# Patient Record
Sex: FEMALE | Race: WHITE | URBAN - METROPOLITAN AREA
[De-identification: names, ages, dates, MRNs, and addresses within clinical notes are randomized per-mention and may not be internally consistent; named-entity substitution may affect disease eponyms.]

---

## 2017-05-16 ENCOUNTER — OUTPATIENT (OUTPATIENT)
Dept: OUTPATIENT SERVICES | Facility: HOSPITAL | Age: 62
LOS: 1 days | End: 2017-05-16

## 2019-09-30 PROBLEM — Z00.00 ENCOUNTER FOR PREVENTIVE HEALTH EXAMINATION: Status: ACTIVE | Noted: 2019-09-30

## 2019-10-03 ENCOUNTER — APPOINTMENT (OUTPATIENT)
Dept: OTOLARYNGOLOGY | Facility: CLINIC | Age: 64
End: 2019-10-03
Payer: COMMERCIAL

## 2019-10-03 VITALS
SYSTOLIC BLOOD PRESSURE: 144 MMHG | HEIGHT: 67 IN | BODY MASS INDEX: 30.29 KG/M2 | WEIGHT: 193 LBS | DIASTOLIC BLOOD PRESSURE: 75 MMHG | HEART RATE: 53 BPM

## 2019-10-03 DIAGNOSIS — Z83.3 FAMILY HISTORY OF DIABETES MELLITUS: ICD-10-CM

## 2019-10-03 DIAGNOSIS — K21.9 GASTRO-ESOPHAGEAL REFLUX DISEASE W/OUT ESOPHAGITIS: ICD-10-CM

## 2019-10-03 DIAGNOSIS — Z87.891 PERSONAL HISTORY OF NICOTINE DEPENDENCE: ICD-10-CM

## 2019-10-03 DIAGNOSIS — Z87.19 PERSONAL HISTORY OF OTHER DISEASES OF THE DIGESTIVE SYSTEM: ICD-10-CM

## 2019-10-03 DIAGNOSIS — Z86.79 PERSONAL HISTORY OF OTHER DISEASES OF THE CIRCULATORY SYSTEM: ICD-10-CM

## 2019-10-03 DIAGNOSIS — Z72.89 OTHER PROBLEMS RELATED TO LIFESTYLE: ICD-10-CM

## 2019-10-03 DIAGNOSIS — I48.91 UNSPECIFIED ATRIAL FIBRILLATION: ICD-10-CM

## 2019-10-03 PROCEDURE — 92567 TYMPANOMETRY: CPT

## 2019-10-03 PROCEDURE — 99204 OFFICE O/P NEW MOD 45 MIN: CPT | Mod: 25

## 2019-10-03 PROCEDURE — 92557 COMPREHENSIVE HEARING TEST: CPT

## 2019-10-03 RX ORDER — PREDNISONE 10 MG/1
10 TABLET ORAL
Qty: 45 | Refills: 0 | Status: ACTIVE | COMMUNITY
Start: 2019-10-03 | End: 1900-01-01

## 2019-10-03 NOTE — ASSESSMENT
[FreeTextEntry1] : Patient with Meniere's of left ear with continued balance problem.  Has had prior audio with snhl but audio today shows increase in snhl from recent prior audio.  Did have negative MRI 5/ 2018.   Recommended trial of oral steroids and will also get neurotology eval for consideration of IT therapy.  \par Also discussed low salt diet, caffene, alcohol and other foods that could affect problem..\par Patient also had stated that current left hearing aide not working well but this may be due to recent change in hearing - will have audiologist consider reprogram aide.  \par Will have patient follow up with Dr. Brantley with repeat audio at that time to see if  oral steroids were helpful\par \par Also discussed  change in diuretic from HCTZ to dyazide but will see how patient responds to prednisone before changing meds.  R and A to prednose discussed.  \par \par

## 2019-10-03 NOTE — HISTORY OF PRESENT ILLNESS
[de-identified] : Problem started about 4 years ago.  Occ nausea and vomits.  Problem intermittend - last episode August and September.  Freq has true spinning.  No blackout or diplopia.  Notes ringing in left ear.  Told of hearing loss - occ fluctuates.  Mri negative in 2018.  Does take hctz daily for bp.  \par Last audio 5/23/2018\par

## 2019-10-03 NOTE — REVIEW OF SYSTEMS
[Seasonal Allergies] : seasonal allergies [Sneezing] : sneezing [Post Nasal Drip] : post nasal drip [Ear Pain] : ear pain [Ear Itch] : ear itch [Hearing Loss] : hearing loss [Dizziness] : dizziness [Vertigo] : vertigo [Lightheadedness] : lightheadedness [Ear Drainage] : ear drainage [Ear Noises] : ear noises [Nasal Congestion] : nasal congestion [Sinus Pain] : sinus pain [Sinus Pressure] : sinus pressure [Sense Of Smell Problem] : sense of smell problem [Snoring With Pauses] : snoring with pauses [Hoarseness] : hoarseness [Throat Clearing] : throat clearing [Throat Pain] : throat pain [Throat Dryness] : throat dryness [Palpitations] : palpitations [Throat Itching] : throat itching [Shortness Of Breath] : shortness of breath [Wheezing] : wheezing [Cough] : cough [Negative] : Heme/Lymph [FreeTextEntry1] : heartburn, sweating at night, palpitations, daytime sleepiness

## 2019-10-03 NOTE — DATA REVIEWED
[de-identified] : reviewed prior audio and compared to current audio - significant decrease in hearing in left ear on today's audio  [de-identified] : MRI reviewed from 2018 - neg TB and IAC

## 2019-11-05 ENCOUNTER — APPOINTMENT (OUTPATIENT)
Dept: PHARMACY | Facility: CLINIC | Age: 64
End: 2019-11-05

## 2019-11-20 ENCOUNTER — APPOINTMENT (OUTPATIENT)
Dept: OTOLARYNGOLOGY | Facility: CLINIC | Age: 64
End: 2019-11-20
Payer: COMMERCIAL

## 2019-11-20 VITALS
SYSTOLIC BLOOD PRESSURE: 107 MMHG | BODY MASS INDEX: 30.29 KG/M2 | HEIGHT: 67 IN | HEART RATE: 68 BPM | DIASTOLIC BLOOD PRESSURE: 62 MMHG | WEIGHT: 193 LBS

## 2019-11-20 PROCEDURE — 99214 OFFICE O/P EST MOD 30 MIN: CPT | Mod: 25

## 2019-11-20 PROCEDURE — 92567 TYMPANOMETRY: CPT

## 2019-11-20 PROCEDURE — 92557 COMPREHENSIVE HEARING TEST: CPT

## 2019-11-20 PROCEDURE — 31231 NASAL ENDOSCOPY DX: CPT

## 2019-11-20 RX ORDER — ONDANSETRON 4 MG/1
4 TABLET, ORALLY DISINTEGRATING ORAL
Qty: 30 | Refills: 0 | Status: ACTIVE | COMMUNITY
Start: 2019-11-20 | End: 1900-01-01

## 2019-11-20 RX ORDER — DIAZEPAM 2 MG/1
2 TABLET ORAL
Qty: 30 | Refills: 0 | Status: ACTIVE | COMMUNITY
Start: 2019-11-20 | End: 1900-01-01

## 2019-11-21 NOTE — HISTORY OF PRESENT ILLNESS
[de-identified] : Symptoms with episodic vertigo began about 4 years ago. Beginning in August 5 episodes of vertigo, spinning.  Took prednisone prescribed by Dr. Gil, no further episodes since.  Left hearing loss fluctuates, associated tinnitus which also fluctuates.  Also gets chronic headaches, recently worsened with nasal congestion.  Takes baclofen, zoloft, on HCTZ, PPI.

## 2019-11-21 NOTE — DATA REVIEWED
[de-identified] : I personally reviewed the patient's audiogram, which shows asymmetric left SNHL, improved since last audio, small low frequency depression in right ear air conduction thresholds, excellent word discrim bilaterally.\par  [de-identified] : I personally reviewed the patient's MRI report, which shows no internal auditory canal or retrocochlear pathology bilaterally.\par

## 2019-12-18 ENCOUNTER — APPOINTMENT (OUTPATIENT)
Dept: OTOLARYNGOLOGY | Facility: CLINIC | Age: 64
End: 2019-12-18
Payer: COMMERCIAL

## 2019-12-18 VITALS
BODY MASS INDEX: 30.29 KG/M2 | SYSTOLIC BLOOD PRESSURE: 131 MMHG | WEIGHT: 193 LBS | HEIGHT: 67 IN | DIASTOLIC BLOOD PRESSURE: 75 MMHG | HEART RATE: 63 BPM

## 2019-12-18 DIAGNOSIS — J31.0 CHRONIC RHINITIS: ICD-10-CM

## 2019-12-18 PROCEDURE — 99214 OFFICE O/P EST MOD 30 MIN: CPT | Mod: 25

## 2019-12-18 PROCEDURE — 92557 COMPREHENSIVE HEARING TEST: CPT

## 2019-12-18 PROCEDURE — 92567 TYMPANOMETRY: CPT

## 2019-12-18 NOTE — DATA REVIEWED
[de-identified] : I personally reviewed the patient's audiogram, which shows bilateral improvement in low frequency hearing loss relative to last audiogram.\par

## 2019-12-18 NOTE — HISTORY OF PRESENT ILLNESS
[de-identified] : One day after Thanksgiving hearing loss worsened significantly in both ears.  Associated episodic vertigo.  Lasted several days.  Reports hearing improvement since starting betahistine and increasing diuretic to once daily.  Also with improvement in tinnitus bilaterally.

## 2020-02-24 ENCOUNTER — APPOINTMENT (OUTPATIENT)
Dept: OTOLARYNGOLOGY | Facility: CLINIC | Age: 65
End: 2020-02-24

## 2020-06-10 ENCOUNTER — APPOINTMENT (OUTPATIENT)
Dept: OTOLARYNGOLOGY | Facility: CLINIC | Age: 65
End: 2020-06-10
Payer: COMMERCIAL

## 2020-06-10 VITALS — RESPIRATION RATE: 14 BRPM | HEART RATE: 61 BPM | DIASTOLIC BLOOD PRESSURE: 75 MMHG | SYSTOLIC BLOOD PRESSURE: 121 MMHG

## 2020-06-10 VITALS — WEIGHT: 193 LBS | TEMPERATURE: 97.9 F | BODY MASS INDEX: 30.29 KG/M2 | HEIGHT: 67 IN

## 2020-06-10 PROCEDURE — 92567 TYMPANOMETRY: CPT

## 2020-06-10 PROCEDURE — 92557 COMPREHENSIVE HEARING TEST: CPT

## 2020-06-10 PROCEDURE — 99214 OFFICE O/P EST MOD 30 MIN: CPT | Mod: 25

## 2020-06-10 RX ORDER — AZELASTINE HYDROCHLORIDE 137 UG/1
137 SPRAY, METERED NASAL TWICE DAILY
Qty: 1 | Refills: 3 | Status: ACTIVE | COMMUNITY
Start: 2020-06-10 | End: 1900-01-01

## 2020-06-10 NOTE — HISTORY OF PRESENT ILLNESS
[de-identified] : 64 y/o woman with MD states that she hasn't had any dizziness since November.  Pt. has noticed occasionally hearing in  both ears get "muffled", believes it has worsened due to seasonal allergies.  Pt. has seen a noticeable improvement with the Betahistine.  Reports worsened postnasal drip, nasal congestion, and cough over past few weeks, fluctuates, some improvement with floanse/oral antihistamine.

## 2020-06-10 NOTE — REVIEW OF SYSTEMS
[Seasonal Allergies] : seasonal allergies [As Noted in HPI] : as noted in HPI [Nasal Congestion] : nasal congestion [Throat Clearing] : throat clearing [Negative] : Endocrine [FreeTextEntry5] : afib

## 2020-06-10 NOTE — REASON FOR VISIT
[Subsequent Evaluation] : a subsequent evaluation for [FreeTextEntry2] : hearing loss, postnasal drip

## 2020-06-10 NOTE — DATA REVIEWED
[de-identified] : I personally reviewed the patient's audiogram, which shows stable hearing loss bilaterally, Type A's tymp bilaterally\par

## 2020-12-16 ENCOUNTER — APPOINTMENT (OUTPATIENT)
Dept: OTOLARYNGOLOGY | Facility: CLINIC | Age: 65
End: 2020-12-16

## 2021-02-01 ENCOUNTER — APPOINTMENT (OUTPATIENT)
Dept: OTOLARYNGOLOGY | Facility: CLINIC | Age: 66
End: 2021-02-01

## 2021-03-24 ENCOUNTER — APPOINTMENT (OUTPATIENT)
Dept: OTOLARYNGOLOGY | Facility: CLINIC | Age: 66
End: 2021-03-24
Payer: MEDICARE

## 2021-03-24 DIAGNOSIS — H93.12 TINNITUS, LEFT EAR: ICD-10-CM

## 2021-03-24 DIAGNOSIS — H90.42 SENSORINEURAL HEARING LOSS, UNILATERAL, LEFT EAR, WITH UNRESTRICTED HEARING ON THE CONTRALATERAL SIDE: ICD-10-CM

## 2021-03-24 DIAGNOSIS — H91.8X9 OTHER SPECIFIED HEARING LOSS, UNSPECIFIED EAR: ICD-10-CM

## 2021-03-24 DIAGNOSIS — H81.02 MENIERE'S DISEASE, LEFT EAR: ICD-10-CM

## 2021-03-24 PROCEDURE — 99213 OFFICE O/P EST LOW 20 MIN: CPT | Mod: 95

## 2021-03-24 RX ORDER — ONDANSETRON 4 MG/1
4 TABLET, ORALLY DISINTEGRATING ORAL
Qty: 30 | Refills: 0 | Status: ACTIVE | COMMUNITY
Start: 2021-03-24 | End: 1900-01-01

## 2021-03-24 NOTE — HISTORY OF PRESENT ILLNESS
[Home] : at home, [unfilled] , at the time of the visit. [Other Location: e.g. Home (Enter Location, City,State)___] : at [unfilled] [Verbal consent obtained from patient] : the patient, [unfilled] [de-identified] : Patient reports no episodes of severe vertigo, tolerating betahistine without side effect, hearing overall remains improved, not consistently needing to wear a hearing aid for the left ear.  Reports mild episodes of dizziness which passed without severe spinning vertigo.

## 2021-06-15 ENCOUNTER — NON-APPOINTMENT (OUTPATIENT)
Age: 66
End: 2021-06-15

## 2021-10-15 ENCOUNTER — NON-APPOINTMENT (OUTPATIENT)
Age: 66
End: 2021-10-15

## 2021-10-15 ENCOUNTER — APPOINTMENT (OUTPATIENT)
Dept: OPHTHALMOLOGY | Facility: CLINIC | Age: 66
End: 2021-10-15
Payer: MEDICARE

## 2021-10-15 PROCEDURE — 92014 COMPRE OPH EXAM EST PT 1/>: CPT

## 2021-11-03 ENCOUNTER — APPOINTMENT (OUTPATIENT)
Dept: OPHTHALMOLOGY | Facility: CLINIC | Age: 66
End: 2021-11-03

## 2021-11-05 ENCOUNTER — OUTPATIENT (OUTPATIENT)
Dept: OUTPATIENT SERVICES | Facility: HOSPITAL | Age: 66
LOS: 1 days | End: 2021-11-05

## 2021-11-16 ENCOUNTER — APPOINTMENT (OUTPATIENT)
Dept: OPHTHALMOLOGY | Facility: HOSPITAL | Age: 66
End: 2021-11-16

## 2021-11-17 ENCOUNTER — APPOINTMENT (OUTPATIENT)
Dept: OPHTHALMOLOGY | Facility: CLINIC | Age: 66
End: 2021-11-17

## 2021-11-24 ENCOUNTER — APPOINTMENT (OUTPATIENT)
Dept: OPHTHALMOLOGY | Facility: CLINIC | Age: 66
End: 2021-11-24

## 2022-01-20 ENCOUNTER — APPOINTMENT (OUTPATIENT)
Dept: OPHTHALMOLOGY | Facility: CLINIC | Age: 67
End: 2022-01-20
Payer: MEDICARE

## 2022-01-20 ENCOUNTER — NON-APPOINTMENT (OUTPATIENT)
Age: 67
End: 2022-01-20

## 2022-01-20 PROCEDURE — 99214 OFFICE O/P EST MOD 30 MIN: CPT

## 2022-02-02 ENCOUNTER — NON-APPOINTMENT (OUTPATIENT)
Age: 67
End: 2022-02-02

## 2022-02-02 ENCOUNTER — APPOINTMENT (OUTPATIENT)
Dept: OPHTHALMOLOGY | Facility: CLINIC | Age: 67
End: 2022-02-02
Payer: MEDICARE

## 2022-02-02 PROCEDURE — 92136 OPHTHALMIC BIOMETRY: CPT

## 2022-02-02 PROCEDURE — 99213 OFFICE O/P EST LOW 20 MIN: CPT

## 2022-02-10 ENCOUNTER — APPOINTMENT (OUTPATIENT)
Dept: OPHTHALMOLOGY | Facility: HOSPITAL | Age: 67
End: 2022-02-10
Payer: MEDICARE

## 2022-02-10 ENCOUNTER — NON-APPOINTMENT (OUTPATIENT)
Age: 67
End: 2022-02-10

## 2022-02-10 PROCEDURE — 66984 XCAPSL CTRC RMVL W/O ECP: CPT | Mod: LT

## 2022-02-11 ENCOUNTER — NON-APPOINTMENT (OUTPATIENT)
Age: 67
End: 2022-02-11

## 2022-02-11 ENCOUNTER — APPOINTMENT (OUTPATIENT)
Dept: OPHTHALMOLOGY | Facility: CLINIC | Age: 67
End: 2022-02-11
Payer: MEDICARE

## 2022-02-11 PROCEDURE — 99024 POSTOP FOLLOW-UP VISIT: CPT

## 2022-02-17 ENCOUNTER — APPOINTMENT (OUTPATIENT)
Dept: OPHTHALMOLOGY | Facility: CLINIC | Age: 67
End: 2022-02-17
Payer: MEDICARE

## 2022-02-17 ENCOUNTER — NON-APPOINTMENT (OUTPATIENT)
Age: 67
End: 2022-02-17

## 2022-02-17 PROCEDURE — 99024 POSTOP FOLLOW-UP VISIT: CPT

## 2022-03-17 ENCOUNTER — APPOINTMENT (OUTPATIENT)
Dept: OPHTHALMOLOGY | Facility: CLINIC | Age: 67
End: 2022-03-17
Payer: MEDICARE

## 2022-03-17 ENCOUNTER — NON-APPOINTMENT (OUTPATIENT)
Age: 67
End: 2022-03-17

## 2022-03-17 PROCEDURE — 99024 POSTOP FOLLOW-UP VISIT: CPT

## 2022-05-20 ENCOUNTER — APPOINTMENT (OUTPATIENT)
Dept: OPHTHALMOLOGY | Facility: CLINIC | Age: 67
End: 2022-05-20

## 2022-06-09 ENCOUNTER — APPOINTMENT (OUTPATIENT)
Dept: OPHTHALMOLOGY | Facility: HOSPITAL | Age: 67
End: 2022-06-09

## 2022-06-10 ENCOUNTER — APPOINTMENT (OUTPATIENT)
Dept: OPHTHALMOLOGY | Facility: CLINIC | Age: 67
End: 2022-06-10

## 2022-06-17 ENCOUNTER — APPOINTMENT (OUTPATIENT)
Dept: OPHTHALMOLOGY | Facility: CLINIC | Age: 67
End: 2022-06-17

## 2022-09-23 RX ORDER — DIAZEPAM 2 MG/1
2 TABLET ORAL
Qty: 30 | Refills: 0 | Status: ACTIVE | COMMUNITY
Start: 2021-03-24 | End: 1900-01-01

## 2024-12-25 PROBLEM — F10.90 ALCOHOL USE: Status: ACTIVE | Noted: 2019-10-03
